# Patient Record
Sex: MALE | Race: BLACK OR AFRICAN AMERICAN | NOT HISPANIC OR LATINO | Employment: UNEMPLOYED | URBAN - METROPOLITAN AREA
[De-identification: names, ages, dates, MRNs, and addresses within clinical notes are randomized per-mention and may not be internally consistent; named-entity substitution may affect disease eponyms.]

---
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
## 2023-04-16 ENCOUNTER — OFFICE VISIT (OUTPATIENT)
Dept: URGENT CARE | Facility: CLINIC | Age: 23
End: 2023-04-16
Payer: MEDICAID

## 2023-04-16 VITALS
RESPIRATION RATE: 20 BRPM | BODY MASS INDEX: 23.34 KG/M2 | HEART RATE: 56 BPM | TEMPERATURE: 98 F | DIASTOLIC BLOOD PRESSURE: 71 MMHG | OXYGEN SATURATION: 100 % | SYSTOLIC BLOOD PRESSURE: 116 MMHG | WEIGHT: 154 LBS | HEIGHT: 68 IN

## 2023-04-16 DIAGNOSIS — Z20.2 ENCOUNTER FOR ASSESSMENT OF STD EXPOSURE: Primary | ICD-10-CM

## 2023-04-16 LAB
C TRACH DNA SPEC QL NAA+PROBE: NOT DETECTED
N GONORRHOEA DNA SPEC QL NAA+PROBE: NOT DETECTED

## 2023-04-16 PROCEDURE — 87661 TRICHOMONAS VAGINALIS AMPLIF: CPT | Mod: 90

## 2023-04-16 PROCEDURE — 99203 OFFICE O/P NEW LOW 30 MIN: CPT | Mod: PBBFAC

## 2023-04-16 PROCEDURE — 99203 OFFICE O/P NEW LOW 30 MIN: CPT | Mod: S$PBB,,,

## 2023-04-16 PROCEDURE — 99203 PR OFFICE/OUTPT VISIT, NEW, LEVL III, 30-44 MIN: ICD-10-PCS | Mod: S$PBB,,,

## 2023-04-16 PROCEDURE — 87591 N.GONORRHOEAE DNA AMP PROB: CPT

## 2023-04-16 NOTE — PROGRESS NOTES
"Subjective:      Patient ID: Shserina Dotson is a 22 y.o. male.    Vitals:  height is 5' 8" (1.727 m) and weight is 69.9 kg (154 lb). His oral temperature is 97.8 °F (36.6 °C). His blood pressure is 116/71 and his pulse is 56 (abnormal). His respiration is 20 and oxygen saturation is 100%.     Chief Complaint: STD Testing (Patient states he is in a new relationship and had unprotected sex. Denies symptoms of any kind.)    Pt would like to be assessed for STDs. Denies any current symptoms.      Constitution: Negative.   HENT: Negative.     Neck: neck negative.   Cardiovascular: Negative.    Eyes: Negative.    Respiratory: Negative.     Gastrointestinal: Negative.    Genitourinary: Negative.    Musculoskeletal: Negative.    Skin: Negative.    Neurological: Negative.     Objective:     Physical Exam   Constitutional: He is oriented to person, place, and time. normal  HENT:   Head: Normocephalic.   Eyes: Pupils are equal, round, and reactive to light.   Cardiovascular: Normal rate and normal pulses.   Pulmonary/Chest: Effort normal.   Abdominal: Normal appearance. Soft.   Musculoskeletal: Normal range of motion.         General: Normal range of motion.   Neurological: He is alert and oriented to person, place, and time.   Skin: Skin is warm and dry.   Vitals reviewed.    Assessment:     1. Encounter for assessment of STD exposure        Plan:       Encounter for assessment of STD exposure  -     T.vaginalisisc, Amplified RNA  -     Chlamydia/GC, PCR      - abstain from sex until all results are known    - this clinic will ONLY call you for POSITIVE = ABNORMAL results. We will not call you to tell you tests are NEGATIVE = NORMAL.  - if you need medication to treat any conditions, it was either prescribed to you today and sent to your pharmacy, or it will be sent when providers view abnormal results.  - if any of your tests are abnormal, we will call you with a plan of care.  - always require condoms  - partners " will need treatment for any +STDs on your testing.     ER precautions given, patient verbalized understanding.     Please see provided patient education for guidance.    Follow up with PCP or return to clinic if symptoms worsen or do not improve.

## 2023-04-16 NOTE — LETTER
April 16, 2023      [facility]  [facility] [address]  Phone: [phone]       Patient: Alison Dotson   YOB: 2000  Date of Visit: 04/16/2023    To Whom It May Concern:    Shreya Dotson  was at [facility] on 04/16/2023. The patient may return to work/school on 04/17/2023 with no restrictions. If you have any questions or concerns, or if I can be of further assistance, please do not hesitate to contact me.    Sincerely,      Mary Gudnio NP

## 2023-04-18 LAB
SPECIMEN SOURCE: NORMAL
T VAGINALIS RRNA SPEC QL NAA+PROBE: NEGATIVE

## 2023-10-24 ENCOUNTER — HOSPITAL ENCOUNTER (EMERGENCY)
Facility: HOSPITAL | Age: 23
Discharge: HOME | End: 2023-10-24
Attending: STUDENT IN AN ORGANIZED HEALTH CARE EDUCATION/TRAINING PROGRAM

## 2023-10-24 VITALS
TEMPERATURE: 98 F | HEART RATE: 59 BPM | DIASTOLIC BLOOD PRESSURE: 72 MMHG | RESPIRATION RATE: 16 BRPM | BODY MASS INDEX: 23.8 KG/M2 | SYSTOLIC BLOOD PRESSURE: 114 MMHG | WEIGHT: 156.5 LBS | OXYGEN SATURATION: 100 %

## 2023-10-24 DIAGNOSIS — K08.89 PAIN, DENTAL: ICD-10-CM

## 2023-10-24 DIAGNOSIS — K04.7 DENTAL ABSCESS: Primary | ICD-10-CM

## 2023-10-24 DIAGNOSIS — K02.9 DENTAL CARIES: ICD-10-CM

## 2023-10-24 PROCEDURE — 99284 EMERGENCY DEPT VISIT MOD MDM: CPT

## 2023-10-24 RX ORDER — AMOXICILLIN AND CLAVULANATE POTASSIUM 875; 125 MG/1; MG/1
1 TABLET, FILM COATED ORAL 2 TIMES DAILY
Qty: 20 TABLET | Refills: 0 | Status: SHIPPED | OUTPATIENT
Start: 2023-10-24

## 2023-10-24 RX ORDER — ACETAMINOPHEN AND CODEINE PHOSPHATE 300; 30 MG/1; MG/1
1 TABLET ORAL EVERY 6 HOURS PRN
Qty: 12 TABLET | Refills: 0 | Status: SHIPPED | OUTPATIENT
Start: 2023-10-24 | End: 2023-11-03

## 2023-10-24 NOTE — ED PROVIDER NOTES
Encounter Date: 10/24/2023       History     Chief Complaint   Patient presents with    Dental Pain     CO DENTAL PAIN X 2 WKS.      The patient presents with dental pain and mouth pain. The onset was 2 weeks ago increased for a couple of days. The course/duration of symptoms is constant. Type of injury: none.  Location: Right lower. The character of symptoms is pain and swelling. The degree of pain is moderate. The exacerbating factor is eating. The relieving factor is none.  Risk factors consist of none.  Prior episodes: occasional.  Therapy today: none.  Associated symptoms: none, denies fever, denies chills, denies nausea, denies vomiting, denies sore throat and denies cough.  Additional history: none.             Review of patient's allergies indicates:   Allergen Reactions    Bananas [banana]      Tomatoes and bananas     History reviewed. No pertinent past medical history.  History reviewed. No pertinent surgical history.  History reviewed. No pertinent family history.  Social History     Tobacco Use    Smoking status: Never    Smokeless tobacco: Never   Substance Use Topics    Alcohol use: Yes    Drug use: Yes     Types: Marijuana     Review of Systems   Constitutional:  Negative for fever.   HENT:  Positive for dental problem. Negative for sore throat.    Respiratory:  Negative for shortness of breath.    Cardiovascular:  Negative for chest pain.   Gastrointestinal:  Negative for nausea.   Genitourinary:  Negative for dysuria.   Musculoskeletal:  Negative for back pain.   Skin:  Negative for rash.   Neurological:  Negative for weakness.   Hematological:  Does not bruise/bleed easily.   All other systems reviewed and are negative.      Physical Exam     Initial Vitals [10/24/23 1157]   BP Pulse Resp Temp SpO2   114/72 (!) 59 16 97.9 °F (36.6 °C) 100 %      MAP       --         Physical Exam    Nursing note and vitals reviewed.  Constitutional: He appears well-developed and well-nourished.   HENT:   Head:  Normocephalic and atraumatic.   Right Ear: Tympanic membrane normal.   Left Ear: Tympanic membrane normal.   Nose: Nose normal.   Mouth/Throat: Uvula is midline, oropharynx is clear and moist and mucous membranes are normal.   Mild right lower dental abscess with dental caries   Neck: Neck supple.   Normal range of motion.  Cardiovascular:  Normal rate, regular rhythm, normal heart sounds and intact distal pulses.           Pulmonary/Chest: Breath sounds normal.   Abdominal: Abdomen is soft. Bowel sounds are normal.   Musculoskeletal:         General: Normal range of motion.      Cervical back: Normal range of motion and neck supple.     Neurological: He is alert and oriented to person, place, and time. He has normal strength.   Skin: Skin is warm and dry.   Psychiatric: He has a normal mood and affect.         ED Course   Procedures  Labs Reviewed - No data to display       Imaging Results    None          Medications - No data to display  Medical Decision Making  The patient presents with dental pain and mouth pain. The onset was 2 weeks ago increased for a couple of days. The course/duration of symptoms is constant. Type of injury: none.  Location: Right lower. The character of symptoms is pain and swelling. The degree of pain is moderate. The exacerbating factor is eating. The relieving factor is none.  Risk factors consist of none.  Prior episodes: occasional.  Therapy today: none.  Associated symptoms: none, denies fever, denies chills, denies nausea, denies vomiting, denies sore throat and denies cough.  Additional history: none.       12:29 PM DISPOSITION: The patient is resting comfortably in no acute distress.  He is hemodynamically stable and is without objective evidence for acute process requiring urgent intervention or hospitalization. I provided counseling to patient with regard to condition, the treatment plan, specific conditions for return, and the importance of follow up. Detailed written and  verbal instructions provided to patient and he expressed a verbal understanding of the discharge instructions and overall management plan. Reiterated the importance of medication administration and safety and advised patient to follow up with primary care provider in 3-5 days or sooner if needed.  Answered questions at this time. The patient is stable for discharge.       Risk  Prescription drug management.      Additional MDM:   Differential Diagnosis:   Dental abscess, facial bones infections, soft tissue facial infections, osteomyelitis, facial bones fractures, among others        APC / Resident Notes:   I was not physically present during the history, exam or disposition of this patient. I was available at all times for consultation. (Alesia)                        Clinical Impression:   Final diagnoses:  [K04.7] Dental abscess (Primary)  [K02.9] Dental caries  [K08.89] Pain, dental        ED Disposition Condition    Discharge Stable          ED Prescriptions       Medication Sig Dispense Start Date End Date Auth. Provider    amoxicillin-clavulanate 875-125mg (AUGMENTIN) 875-125 mg per tablet Take 1 tablet by mouth 2 (two) times daily. 20 tablet 10/24/2023 -- Colin Perdomo ACNP    acetaminophen-codeine 300-30mg (TYLENOL #3) 300-30 mg Tab Take 1 tablet by mouth every 6 (six) hours as needed. 12 tablet 10/24/2023 11/3/2023 Colin Perdomo ACNP          Follow-up Information       Follow up With Specialties Details Why Contact Info    follow up with dentist of choice in 2-3 days        [facility] Emergency Medicine  If symptoms worsen [address] [facility] [address] [phone]             Colin Perdomo ACNP  10/24/23 1229       Luis Felipe Dumas MD  10/24/23 2007

## 2023-10-24 NOTE — Clinical Note
"Alison Maldonado" Mauri was seen and treated in our emergency department on 10/24/2023.  He may return to work on 10/26/2023.       If you have any questions or concerns, please don't hesitate to call.      Colin Perdomo, ACNP"